# Patient Record
Sex: MALE | Race: WHITE | Employment: OTHER | URBAN - METROPOLITAN AREA
[De-identification: names, ages, dates, MRNs, and addresses within clinical notes are randomized per-mention and may not be internally consistent; named-entity substitution may affect disease eponyms.]

---

## 2017-10-11 ENCOUNTER — APPOINTMENT (OUTPATIENT)
Dept: CT IMAGING | Age: 82
End: 2017-10-11
Attending: EMERGENCY MEDICINE
Payer: MEDICARE

## 2017-10-11 ENCOUNTER — HOSPITAL ENCOUNTER (EMERGENCY)
Age: 82
Discharge: HOME | End: 2017-10-11
Attending: EMERGENCY MEDICINE
Payer: MEDICARE

## 2017-10-11 VITALS
WEIGHT: 160 LBS | DIASTOLIC BLOOD PRESSURE: 65 MMHG | SYSTOLIC BLOOD PRESSURE: 130 MMHG | RESPIRATION RATE: 26 BRPM | HEIGHT: 68 IN | TEMPERATURE: 97.4 F | HEART RATE: 64 BPM | BODY MASS INDEX: 24.25 KG/M2 | OXYGEN SATURATION: 96 %
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
## 2017-10-11 PROCEDURE — 99284 EMERGENCY DEPT VISIT MOD MDM: CPT | Performed by: EMERGENCY MEDICINE

## 2017-10-11 PROCEDURE — 70450 CT HEAD/BRAIN W/O DYE: CPT

## 2017-10-11 RX ORDER — CLOPIDOGREL BISULFATE 75 MG/1
75 TABLET ORAL
COMMUNITY

## 2017-10-11 NOTE — ED NOTES
Per pt's daughter, pt went to geriatric doctor on Tuesday and was having a drop in blood pressure when standing. Pt also had a bought of diarrhea on Monday as well.  Pt has no complaints of pain at this time

## 2017-10-11 NOTE — DISCHARGE INSTRUCTIONS

## 2017-10-11 NOTE — ED NOTES
I have reviewed discharge instructions with the patient. The patient verbalized understanding. Patient left ED via Discharge Method: wheelchair to Home with daughter. Opportunity for questions and clarification provided. Patient given 0 scripts.

## 2017-10-11 NOTE — ED TRIAGE NOTES
Pt states he fell while trying to go to the bathroom. Pt state his legs went out from under him. Family states the pt uses a walker. Family states the pt hit his head. Denies any loc.

## 2017-10-11 NOTE — ED PROVIDER NOTES
HPI Comments: Lars Alonzo states that he feels like he fell because he got up too quickly. He has had similar falls infrequently but maybe 3 or 4 times over the last 5 years. States it generally is some mild weakness to his leg but otherwise no baseline issues. He denies any pain or injury. Her abrasions. He has no focal motor or sensory changes. Denies any head or neck injury. Mentally quite capable mentioned above has 0 complaints of any bony or other injury related to his fall. There was no syncope with this. There is no focal motor or neuro changes. Patient is a 80 y.o. male presenting with fall. The history is provided by the patient. Fall   The accident occurred 1 to 2 hours ago. The fall occurred while standing. He fell from a height of ground level. He landed on hard floor. Pertinent negatives include no fever. Past Medical History:   Diagnosis Date    BPH (benign prostatic hyperplasia)     CAD (coronary artery disease)     Cancer (HCC)     colon    Glaucoma     Hearing loss of both ears     Hypercholesterolemia     Hypertension        Past Surgical History:   Procedure Laterality Date    CARDIAC SURG PROCEDURE UNLIST      stent    HX COLONOSCOPY      HX GI      part of colon removed due to cancer         Family History:   Problem Relation Age of Onset    Heart Disease Mother     No Known Problems Father        Social History     Social History    Marital status:      Spouse name: N/A    Number of children: N/A    Years of education: N/A     Occupational History    Not on file. Social History Main Topics    Smoking status: Never Smoker    Smokeless tobacco: Never Used    Alcohol use No    Drug use: Not on file    Sexual activity: Not on file     Other Topics Concern    Not on file     Social History Narrative         ALLERGIES: Levaquin [levofloxacin] and Penicillins    Review of Systems   Constitutional: Negative for chills and fever.    Respiratory: Negative. Genitourinary: Negative. All other systems reviewed and are negative. Vitals:    10/11/17 1245 10/11/17 1437   BP: 123/85 135/64   Pulse: 68 65   Resp: 26    Temp: 97.4 °F (36.3 °C)    SpO2: 94% 96%   Weight: 72.6 kg (160 lb)    Height: 5' 8\" (1.727 m)             Physical Exam   Constitutional: He appears well-developed and well-nourished. No distress. HENT:   Head: Atraumatic. Eyes: No scleral icterus. Neck: Neck supple. Cardiovascular: Normal rate. Pulmonary/Chest: Effort normal. No respiratory distress. He has no wheezes. Abdominal: Soft. There is no tenderness. There is no rebound. Musculoskeletal: Normal range of motion. Neurological: He is alert. He exhibits normal muscle tone. Coordination normal.   Skin: Skin is warm and dry. Psychiatric: His behavior is normal. Thought content normal.   Nursing note and vitals reviewed. MDM  Number of Diagnoses or Management Options  Fall, initial encounter:   Diagnosis management comments: Patient with a non-syncopal fall quite benign exam and no history of worrisome medication use such as anticoagulants. Patient was not felt to require head CT was stone protocol which showed no acute process. Amount and/or Complexity of Data Reviewed  Tests in the radiology section of CPT®: reviewed and ordered  Independent visualization of images, tracings, or specimens: yes    Risk of Complications, Morbidity, and/or Mortality  Presenting problems: moderate  Management options: moderate    Patient Progress  Patient progress: stable    ED Course       Procedures        HEAD CT without contrast  10/11/2017 1:59 PM      CLINICAL INFORMATION: [de-identified]year-old status post fall on blood thinners      COMPARISON: None available     PROCEDURE: Emergent noncontrast head CT was performed in the axial plane. Brain,  bone, and soft tissue windows reviewed. Radiation dose reduction techniques were  used for this study.  Our CT scanners used one or all of the following: Automated  exposure control, adjustment of the MA and/or kV according to patient size,  iterative reconstruction.     FINDINGS: No acute intracranial hemorrhage, mass, or mass effect. No extra-axial  fluid collections. No midline shift; the basilar cisterns are patent. Moderate  volume loss is unchanged. The ventricles are stable in caliber and symmetric to  the subarachnoid spaces.     Mild periventricular and deep patchy white matter low attenuation is nonspecific  but likely related to chronic small vessel ischemic change. There are no  specific signs to suggest acute large vessel territory ischemia. The gray-white  differentiation is otherwise preserved.     No depressed or displaced calvarial fracture. Visualized portions of the  paranasal sinuses and mastoid air cells are patent.     IMPRESSIONS:     1. No convincing acute intracranial abnormality.

## 2018-01-16 PROBLEM — I25.83 CORONARY ARTERY DISEASE DUE TO LIPID RICH PLAQUE: Status: ACTIVE | Noted: 2018-01-16

## 2018-01-16 PROBLEM — R60.9 EDEMA: Status: ACTIVE | Noted: 2018-01-16

## 2018-01-16 PROBLEM — R06.09 DYSPNEA ON EXERTION: Status: ACTIVE | Noted: 2018-01-16

## 2018-01-16 PROBLEM — G30.9 ALZHEIMER'S DEMENTIA WITHOUT BEHAVIORAL DISTURBANCE (HCC): Status: ACTIVE | Noted: 2018-01-16

## 2018-01-16 PROBLEM — R53.83 FATIGUE: Status: ACTIVE | Noted: 2018-01-16

## 2018-01-16 PROBLEM — I25.10 CORONARY ARTERY DISEASE DUE TO LIPID RICH PLAQUE: Status: ACTIVE | Noted: 2018-01-16

## 2018-01-16 PROBLEM — F02.80 ALZHEIMER'S DEMENTIA WITHOUT BEHAVIORAL DISTURBANCE (HCC): Status: ACTIVE | Noted: 2018-01-16

## 2020-06-03 ENCOUNTER — APPOINTMENT (OUTPATIENT)
Dept: GENERAL RADIOLOGY | Age: 85
End: 2020-06-03
Attending: EMERGENCY MEDICINE
Payer: MEDICARE

## 2020-06-03 ENCOUNTER — HOSPITAL ENCOUNTER (EMERGENCY)
Age: 85
Discharge: HOME | End: 2020-06-03
Attending: EMERGENCY MEDICINE
Payer: MEDICARE

## 2020-06-03 VITALS
HEART RATE: 83 BPM | BODY MASS INDEX: 21.51 KG/M2 | HEIGHT: 72 IN | RESPIRATION RATE: 22 BRPM | SYSTOLIC BLOOD PRESSURE: 113 MMHG | OXYGEN SATURATION: 93 % | DIASTOLIC BLOOD PRESSURE: 58 MMHG | TEMPERATURE: 98.8 F | WEIGHT: 158.8 LBS

## 2020-06-03 DIAGNOSIS — M25.561 CHRONIC ARTHRALGIAS OF KNEES AND HIPS: Primary | ICD-10-CM

## 2020-06-03 DIAGNOSIS — M25.562 CHRONIC ARTHRALGIAS OF KNEES AND HIPS: Primary | ICD-10-CM

## 2020-06-03 DIAGNOSIS — G89.29 CHRONIC ARTHRALGIAS OF KNEES AND HIPS: Primary | ICD-10-CM

## 2020-06-03 DIAGNOSIS — M25.551 CHRONIC ARTHRALGIAS OF KNEES AND HIPS: Primary | ICD-10-CM

## 2020-06-03 DIAGNOSIS — M25.552 CHRONIC ARTHRALGIAS OF KNEES AND HIPS: Primary | ICD-10-CM

## 2020-06-03 LAB
ALBUMIN SERPL-MCNC: 3.3 G/DL (ref 3.2–4.6)
ALBUMIN/GLOB SERPL: 0.7 {RATIO} (ref 1.2–3.5)
ALP SERPL-CCNC: 156 U/L (ref 50–136)
ALT SERPL-CCNC: 40 U/L (ref 12–65)
ANION GAP SERPL CALC-SCNC: 8 MMOL/L (ref 7–16)
APPEARANCE UR: CLEAR
AST SERPL-CCNC: 27 U/L (ref 15–37)
BASOPHILS # BLD: 0 K/UL (ref 0–0.2)
BASOPHILS NFR BLD: 0 % (ref 0–2)
BILIRUB SERPL-MCNC: 1 MG/DL (ref 0.2–1.1)
BILIRUB UR QL: NEGATIVE
BUN SERPL-MCNC: 16 MG/DL (ref 8–23)
CALCIUM SERPL-MCNC: 8.8 MG/DL (ref 8.3–10.4)
CHLORIDE SERPL-SCNC: 103 MMOL/L (ref 98–107)
CO2 SERPL-SCNC: 27 MMOL/L (ref 21–32)
COLOR UR: YELLOW
CREAT SERPL-MCNC: 0.88 MG/DL (ref 0.8–1.5)
DIFFERENTIAL METHOD BLD: ABNORMAL
EOSINOPHIL # BLD: 0.1 K/UL (ref 0–0.8)
EOSINOPHIL NFR BLD: 1 % (ref 0.5–7.8)
ERYTHROCYTE [DISTWIDTH] IN BLOOD BY AUTOMATED COUNT: 13.1 % (ref 11.9–14.6)
GLOBULIN SER CALC-MCNC: 4.6 G/DL (ref 2.3–3.5)
GLUCOSE SERPL-MCNC: 92 MG/DL (ref 65–100)
GLUCOSE UR STRIP.AUTO-MCNC: NEGATIVE MG/DL
HCT VFR BLD AUTO: 41.7 % (ref 41.1–50.3)
HGB BLD-MCNC: 14.4 G/DL (ref 13.6–17.2)
HGB UR QL STRIP: NEGATIVE
IMM GRANULOCYTES # BLD AUTO: 0.1 K/UL (ref 0–0.5)
IMM GRANULOCYTES NFR BLD AUTO: 1 % (ref 0–5)
KETONES UR QL STRIP.AUTO: NEGATIVE MG/DL
LEUKOCYTE ESTERASE UR QL STRIP.AUTO: NEGATIVE
LYMPHOCYTES # BLD: 1.8 K/UL (ref 0.5–4.6)
LYMPHOCYTES NFR BLD: 14 % (ref 13–44)
MCH RBC QN AUTO: 31.6 PG (ref 26.1–32.9)
MCHC RBC AUTO-ENTMCNC: 34.5 G/DL (ref 31.4–35)
MCV RBC AUTO: 91.4 FL (ref 79.6–97.8)
MONOCYTES # BLD: 1.3 K/UL (ref 0.1–1.3)
MONOCYTES NFR BLD: 10 % (ref 4–12)
NEUTS SEG # BLD: 9.6 K/UL (ref 1.7–8.2)
NEUTS SEG NFR BLD: 75 % (ref 43–78)
NITRITE UR QL STRIP.AUTO: NEGATIVE
NRBC # BLD: 0 K/UL (ref 0–0.2)
PH UR STRIP: 5 [PH] (ref 5–9)
PLATELET # BLD AUTO: 205 K/UL (ref 150–450)
PMV BLD AUTO: 10.6 FL (ref 9.4–12.3)
POTASSIUM SERPL-SCNC: 3.6 MMOL/L (ref 3.5–5.1)
PROT SERPL-MCNC: 7.9 G/DL (ref 6.3–8.2)
PROT UR STRIP-MCNC: NEGATIVE MG/DL
RBC # BLD AUTO: 4.56 M/UL (ref 4.23–5.6)
SODIUM SERPL-SCNC: 138 MMOL/L (ref 136–145)
SP GR UR REFRACTOMETRY: 1.02 (ref 1–1.02)
UROBILINOGEN UR QL STRIP.AUTO: 0.2 EU/DL (ref 0.2–1)
WBC # BLD AUTO: 12.8 K/UL (ref 4.3–11.1)

## 2020-06-03 PROCEDURE — 99285 EMERGENCY DEPT VISIT HI MDM: CPT

## 2020-06-03 PROCEDURE — 85025 COMPLETE CBC W/AUTO DIFF WBC: CPT

## 2020-06-03 PROCEDURE — 80053 COMPREHEN METABOLIC PANEL: CPT

## 2020-06-03 PROCEDURE — 71045 X-RAY EXAM CHEST 1 VIEW: CPT

## 2020-06-03 PROCEDURE — 72100 X-RAY EXAM L-S SPINE 2/3 VWS: CPT

## 2020-06-03 PROCEDURE — 81003 URINALYSIS AUTO W/O SCOPE: CPT

## 2020-06-03 NOTE — ED TRIAGE NOTES
Pt arrives via [facility] from [facility] living. Fall 3 days ago, hospice pt, unsure if he hurt anything. Family thinks something else is going on because getting morphine prn. Last dose 0330. Right side of ribs small bruise. Complaints of generalized pain.

## 2020-06-03 NOTE — ED NOTES
I have reviewed discharge instructions with the patient. The patient verbalized understanding. Patient left ED via Discharge Method: stretcher to SNF with [address] for questions and clarification provided. Patient given 0 scripts. To continue your aftercare when you leave the hospital, you may receive an automated call from our care team to check in on how you are doing. This is a free service and part of our promise to provide the best care and service to meet your aftercare needs.  If you have questions, or wish to unsubscribe from this service please call [phone]. Thank you for Choosing our [facility] Emergency Department.

## 2020-06-03 NOTE — ED PROVIDER NOTES
59-year-old male with advanced dementia presenting for evaluation of family's concern. They state that he seems to be requiring more pain meds than normal and I think he may have fallen. This information is being given by EMS as the patient is unable to tell a story.       Fall          Past Medical History:   Diagnosis Date    Alzheimer disease     BPH (benign prostatic hyperplasia)     CAD (coronary artery disease)     Cancer ([address]     colon    [address] [facility] [address] [address] [address]     Glaucoma     Hearing loss of both ears     Hypercholesterolemia     Hypertension     Myocardial infarction ([facility] [address] [facility] 2003    Myocardial infarction ([facility] [address] [facility] 2006       Past Surgical History:   Procedure Laterality Date    CARDIAC SURG PROCEDURE UNLIST      stent    HX COLONOSCOPY      HX CORONARY STENT PLACEMENT  11/08/2006    [facility] per Dr Nieves:LAD:2.5 X 16 mm Taxus SANDHYA toproximal segment & PTCA to ostial LAD Dx.   [facility] HX GI      part of colon removed due to cancer         Family History:   Problem Relation Age of Onset    Heart Disease Mother     No Known Problems Father        Social History     Socioeconomic History    Marital status:      Spouse name: Not on file    Number of children: Not on file    Years of education: Not on file    Highest education level: Not on file   Occupational History    Not on file   Social Needs    Financial resource strain: Not on file    Food insecurity     Worry: Not on file     Inability: Not on file    Transportation needs     Medical: Not on file     Non-medical: Not on file   Tobacco Use    Smoking status: Never Smoker    Smokeless tobacco: Never Used   Substance and Sexual Activity    Alcohol use: No    Drug use: Not on file    Sexual activity: Not on file   Lifestyle    Physical activity     Days per week: Not on file     Minutes per session: Not on file    Stress: Not on file   Relationships    Social connections     Talks on phone: Not on file     Gets together: Not on file     Attends Yarsanism service: Not on file     Active member of club or organization: Not on file     Attends meetings of clubs or organizations: Not on file     Relationship status: Not on file    Intimate partner violence     Fear of current or ex partner: Not on file     Emotionally abused: Not on file     Physically abused: Not on file     Forced sexual activity: Not on file   Other Topics Concern    Not on file   Social History Narrative    Not on file         ALLERGIES: Levaquin [levofloxacin] and Penicillins    Review of Systems   Unable to perform ROS: Dementia   Constitutional: Positive for fatigue. Vitals:    06/03/20 1343 06/03/20 1345   BP: 108/61 108/61   Pulse: 90 88   Resp:  22   Temp:  98.8 °F (37.1 °C)   SpO2: 92% 91%   Weight:  72 kg (158 lb 12.8 oz)   Height:  6' (1.829 m)            Physical Exam  Vitals signs and nursing note reviewed. Constitutional:       Appearance: He is well-developed. HENT:      Head: Normocephalic and atraumatic. Mouth/Throat:      Mouth: Mucous membranes are dry. Eyes:      Conjunctiva/sclera: Conjunctivae normal.      Pupils: Pupils are equal, round, and reactive to light. Neck:      Musculoskeletal: Normal range of motion and neck supple. Cardiovascular:      Rate and Rhythm: Normal rate and regular rhythm. Heart sounds: Normal heart sounds. Pulmonary:      Effort: Pulmonary effort is normal.      Breath sounds: Normal breath sounds. Abdominal:      General: Bowel sounds are normal.      Palpations: Abdomen is soft. Musculoskeletal: Normal range of motion. General: No tenderness or deformity. Skin:     General: Skin is warm and dry. Neurological:      Mental Status: He is alert and oriented to person, place, and time. Cranial Nerves: No cranial nerve deficit.    Psychiatric:         Behavior: Behavior normal.          MDM  Number of Diagnoses or Management Options  Chronic arthralgias of knees and hips: Diagnosis management comments: 70-year-old male presenting for evaluation given family's concern. We will get a chest x-ray, lumbar x-ray basic labs and urinalysis. Patient upon prompting moves all extremities without any grimacing or signs of distress. Amount and/or Complexity of Data Reviewed  Clinical lab tests: ordered and reviewed (Results for orders placed or performed during the hospital encounter of 06/03/20  -CBC WITH AUTOMATED DIFF       Result                      Value             Ref Range           WBC                         12.8 (H)          4.3 - 11.1 K*       RBC                         4.56              4.23 - 5.6 M*       HGB                         14.4              13.6 - 17.2 *       HCT                         41.7              41.1 - 50.3 %       MCV                         91.4              79.6 - 97.8 *       MCH                         31.6              26.1 - 32.9 *       MCHC                        34.5              31.4 - 35.0 *       RDW                         13.1              11.9 - 14.6 %       PLATELET                    205               150 - 450 K/*       MPV                         10.6              9.4 - 12.3 FL       ABSOLUTE NRBC               0.00              0.0 - 0.2 K/*       DF                          AUTOMATED                             NEUTROPHILS                 75                43 - 78 %           LYMPHOCYTES                 14                13 - 44 %           MONOCYTES                   10                4.0 - 12.0 %        EOSINOPHILS                 1                 0.5 - 7.8 %         BASOPHILS                   0                 0.0 - 2.0 %         IMMATURE GRANULOCYTES       1                 0.0 - 5.0 %         ABS. NEUTROPHILS            9.6 (H)           1.7 - 8.2 K/*       ABS. LYMPHOCYTES            1.8               0.5 - 4.6 K/*       ABS. MONOCYTES              1.3               0.1 - 1.3 K/*       ABS.  EOSINOPHILS            0.1 0.0 - 0.8 K/*       ABS. BASOPHILS              0.0               0.0 - 0.2 K/*       ABS. IMM. GRANS.            0.1               0.0 - 0.5 K/*  -METABOLIC PANEL, COMPREHENSIVE       Result                      Value             Ref Range           Sodium                      138               136 - 145 mm*       Potassium                   3.6               3.5 - 5.1 mm*       Chloride                    103               98 - 107 mmo*       CO2                         27                21 - 32 mmol*       Anion gap                   8                 7 - 16 mmol/L       Glucose                     92                65 - 100 mg/*       BUN                         16                8 - 23 MG/DL        Creatinine                  0.88              0.8 - 1.5 MG*       GFR est AA                  >60               >60 ml/min/1*       GFR est non-AA              >60               >60 ml/min/1*       Calcium                     8.8               8.3 - 10.4 M*       Bilirubin, total            1.0               0.2 - 1.1 MG*       ALT (SGPT)                  40                12 - 65 U/L         AST (SGOT)                  27                15 - 37 U/L         Alk.  phosphatase            156 (H)           50 - 136 U/L        Protein, total              7.9               6.3 - 8.2 g/*       Albumin                     3.3               3.2 - 4.6 g/*       Globulin                    4.6 (H)           2.3 - 3.5 g/*       A-G Ratio                   0.7 (L)           1.2 - 3.5      -URINALYSIS W/ RFLX MICROSCOPIC       Result                      Value             Ref Range           Color                       YELLOW                                Appearance                  CLEAR                                 Specific gravity            1.016             1.001 - 1.02*       pH (UA)                     5.0               5.0 - 9.0           Protein                     Negative          NEG mg/dL Glucose                     Negative          mg/dL               Ketone                      Negative          NEG mg/dL           Bilirubin                   Negative          NEG                 Blood                       Negative          NEG                 Urobilinogen                0.2               0.2 - 1.0 EU*       Nitrites                    Negative          NEG                 Leukocyte Esterase          Negative          NEG            )  Tests in the radiology section of CPT®: ordered and reviewed (Xr Spine Lumb 2 Or 3 V    Result Date: 6/3/2020  Clinical History: The patient is a 80years year old Male presenting with symptoms of Back Pain Comparison:  None Findings:  3 views were obtained. Five lumbar-type vertebral bodies are demonstrated. Normal curvature alignment is maintained. There is moderate degenerative disc height loss at L3/4 and to lesser degree at L4/5. Vertebral body height and intervertebral disc height are within normal limits. Lower lumbar facet arthropathy is demonstrated. Impression: Chronic degenerative disc disease. CPT code(s) 37842     Xr Chest Port    Result Date: 6/3/2020  AP chest radiograph History: chest wall pain, 89 years Male Comparison: None available Findings:   Normal cardiomediastinal silhouette. Low lung volumes. Nonspecific mild diffuse interstitial prominence. Mild subsegmental atelectasis bilateral lung bases. No evidence of pneumothorax, pleural effusion, or air space consolidation. Evidence of cholecystectomy. Visualized soft tissue and osseous structures otherwise unremarkable. Impression:  Low lung volumes with nonspecific mild diffuse interstitial prominence.     )  Independent visualization of images, tracings, or specimens: yes    Risk of Complications, Morbidity, and/or Mortality  Presenting problems: moderate  Diagnostic procedures: moderate  Management options: moderate  General comments: The patient is in no distress.   Vital signs are unremarkable. All extremities are mobile. Chest x-ray and lumbar spine are clear. Blood work is normal and urinalysis is clean. I see no need for further intervention.     Patient Progress  Patient progress: improved         Procedures